# Patient Record
Sex: FEMALE | Race: WHITE | NOT HISPANIC OR LATINO | Employment: UNEMPLOYED | URBAN - METROPOLITAN AREA
[De-identification: names, ages, dates, MRNs, and addresses within clinical notes are randomized per-mention and may not be internally consistent; named-entity substitution may affect disease eponyms.]

---

## 2024-04-03 ENCOUNTER — OFFICE VISIT (OUTPATIENT)
Dept: PRIMARY CARE CLINIC | Facility: CLINIC | Age: 24
End: 2024-04-03
Payer: COMMERCIAL

## 2024-04-03 VITALS
BODY MASS INDEX: 20.98 KG/M2 | SYSTOLIC BLOOD PRESSURE: 128 MMHG | DIASTOLIC BLOOD PRESSURE: 84 MMHG | HEIGHT: 62 IN | TEMPERATURE: 98 F | RESPIRATION RATE: 16 BRPM | OXYGEN SATURATION: 100 % | HEART RATE: 92 BPM | WEIGHT: 114 LBS

## 2024-04-03 DIAGNOSIS — Z83.42 FAMILY HISTORY OF HIGH CHOLESTEROL: ICD-10-CM

## 2024-04-03 DIAGNOSIS — Z00.00 WELLNESS EXAMINATION: ICD-10-CM

## 2024-04-03 DIAGNOSIS — F41.1 GAD (GENERALIZED ANXIETY DISORDER): ICD-10-CM

## 2024-04-03 DIAGNOSIS — F33.42 RECURRENT MAJOR DEPRESSIVE DISORDER, IN FULL REMISSION: ICD-10-CM

## 2024-04-03 DIAGNOSIS — Z76.89 ENCOUNTER TO ESTABLISH CARE WITH NEW DOCTOR: Primary | ICD-10-CM

## 2024-04-03 DIAGNOSIS — E55.9 VITAMIN D DEFICIENCY: ICD-10-CM

## 2024-04-03 PROBLEM — K64.4 RESIDUAL HEMORRHOIDAL SKIN TAGS: Status: ACTIVE | Noted: 2024-04-03

## 2024-04-03 PROCEDURE — 3008F BODY MASS INDEX DOCD: CPT | Mod: CPTII,,, | Performed by: STUDENT IN AN ORGANIZED HEALTH CARE EDUCATION/TRAINING PROGRAM

## 2024-04-03 PROCEDURE — 3079F DIAST BP 80-89 MM HG: CPT | Mod: CPTII,,, | Performed by: STUDENT IN AN ORGANIZED HEALTH CARE EDUCATION/TRAINING PROGRAM

## 2024-04-03 PROCEDURE — 99385 PREV VISIT NEW AGE 18-39: CPT | Mod: ,,, | Performed by: STUDENT IN AN ORGANIZED HEALTH CARE EDUCATION/TRAINING PROGRAM

## 2024-04-03 PROCEDURE — 3074F SYST BP LT 130 MM HG: CPT | Mod: CPTII,,, | Performed by: STUDENT IN AN ORGANIZED HEALTH CARE EDUCATION/TRAINING PROGRAM

## 2024-04-03 PROCEDURE — 1160F RVW MEDS BY RX/DR IN RCRD: CPT | Mod: CPTII,,, | Performed by: STUDENT IN AN ORGANIZED HEALTH CARE EDUCATION/TRAINING PROGRAM

## 2024-04-03 PROCEDURE — 3044F HG A1C LEVEL LT 7.0%: CPT | Mod: CPTII,,, | Performed by: STUDENT IN AN ORGANIZED HEALTH CARE EDUCATION/TRAINING PROGRAM

## 2024-04-03 PROCEDURE — 1159F MED LIST DOCD IN RCRD: CPT | Mod: CPTII,,, | Performed by: STUDENT IN AN ORGANIZED HEALTH CARE EDUCATION/TRAINING PROGRAM

## 2024-04-03 RX ORDER — BUPROPION HYDROCHLORIDE 300 MG/1
TABLET ORAL
COMMUNITY
Start: 2022-01-02

## 2024-04-03 RX ORDER — GABAPENTIN 300 MG/1
CAPSULE ORAL
COMMUNITY
Start: 2022-01-02

## 2024-04-03 RX ORDER — HYDROXYZINE PAMOATE 25 MG/1
CAPSULE ORAL
COMMUNITY
Start: 2022-01-02

## 2024-04-03 RX ORDER — PROPRANOLOL HYDROCHLORIDE 10 MG/1
TABLET ORAL
COMMUNITY
Start: 2022-01-02

## 2024-04-03 NOTE — PROGRESS NOTES
ANNUAL WELLNESS NOTE    Chief Complaint:  Establish Care     HPI:  Vanna Martinez is a 23 y.o. female    Past Medical History:   Anxiety  Vitamin D Deficiency   Depression    Patient Care Team:  Itzel Carlos MD as PCP - General (Internal Medicine)    Presents today for wellness visit. Describes overall health as good. Diet is healthy. Exercises 5 or more times per week. Tobacco use: never. Alcohol use: rare (special occasion). Caffeine intake: minimal/rare. Eye exam: annually (wears contacts). Dental exam: twice annually.  Patient is doing well.  Here to establish care as well.  Patient will go and get her wellness labs done soon.    Review of Systems   Constitutional:  Negative for chills and fever.   Respiratory:  Negative for cough.    Cardiovascular:  Negative for leg swelling.   Gastrointestinal:  Negative for abdominal pain, diarrhea, nausea and vomiting.   Genitourinary:  Negative for dysuria and hematuria.   Psychiatric/Behavioral:  Negative for dysphoric mood. The patient is not nervous/anxious.      Physical Exam  Vitals and nursing note reviewed.   Constitutional:       General: She is not in acute distress.     Appearance: Normal appearance. She is not ill-appearing.   HENT:      Nose: No rhinorrhea.      Mouth/Throat:      Mouth: Mucous membranes are moist.   Eyes:      General: No scleral icterus.     Conjunctiva/sclera: Conjunctivae normal.   Cardiovascular:      Rate and Rhythm: Normal rate and regular rhythm.      Pulses: Normal pulses.      Heart sounds: Normal heart sounds. No murmur heard.  Pulmonary:      Effort: Pulmonary effort is normal. No respiratory distress.      Breath sounds: Normal breath sounds. No wheezing.   Abdominal:      Palpations: Abdomen is soft.      Tenderness: There is no abdominal tenderness.   Musculoskeletal:      Right lower leg: No edema.      Left lower leg: No edema.   Skin:     General: Skin is warm.      Coloration: Skin is not jaundiced.   Neurological:       Mental Status: She is alert. Mental status is at baseline.      Gait: Gait normal.   Psychiatric:         Mood and Affect: Mood normal.         Behavior: Behavior normal.       Assessment/Plan:  1. Encounter to establish care with new doctor  Comments:  Reviewed medical history, reconciled medications   Ordered wellness labs  Requested records from previous provider/specialists    2. Wellness examination  Assessment & Plan:  Health Maintenance:  Routine Health Maintenance   Exercise as tolerated, >30 minutes a day for 3-5 days a week.  Counseled patient on compliance with medications and healthy diet.     Age-Appropriate Screening  Vaccinations:    - Flu:  Postponed, patient's preference   - Pneumonia:  NA   - Shingles (>50):  NA   - Tdap:  Postponed, patient's preference   - COVID:  Postponed, patient's preference    Screening:   - Pap Smear (21-65): UTD, requested records   - Mammogram (40-50 discuss w/ pt, all >50):  NA   - Osteoporosis (all>65, sooner if risk factors):  NA   - Lung Ca. Screening (50-80 if >20 pack yrs current or quit <15 yrs):  NA   - Colon Ca. Screening (age >45):  NA   - Hep. C, HIV: Negative     Orders:  -     CBC Auto Differential; Future; Expected date: 04/03/2024  -     Comprehensive Metabolic Panel; Future; Expected date: 04/03/2024  -     TSH; Future; Expected date: 04/03/2024  -     Lipid Panel; Future; Expected date: 04/03/2024  -     Urinalysis; Future; Expected date: 04/03/2024  -     Hemoglobin A1C; Future; Expected date: 04/03/2024  -     Vitamin D; Future; Expected date: 04/03/2024    3. Family history of high cholesterol  -     Lipid Panel; Future; Expected date: 04/03/2024    4. GREER (generalized anxiety disorder)  Assessment & Plan:  Stable, improved   Continue Wellbutrin, Propranolol   Encouraged self coping mechanisms (deep breathing, exercise, yoga, meditation, etc)     5. Recurrent major depressive disorder, in full remission  Assessment & Plan:  See above for further  details   Well controlled   Continue Wellbutrin    6. Vitamin D deficiency  Assessment & Plan:  Stable, continue supplementation   Repeat Level at Wellness Visit   Encouraged moderate sun exposure, increase PO calcium intake  Orders:  -     Vitamin D; Future; Expected date: 04/03/2024    Follow up in about 1 year (around 4/3/2025) for Wellness.

## 2024-04-05 ENCOUNTER — LAB VISIT (OUTPATIENT)
Dept: LAB | Facility: HOSPITAL | Age: 24
End: 2024-04-05
Attending: STUDENT IN AN ORGANIZED HEALTH CARE EDUCATION/TRAINING PROGRAM
Payer: COMMERCIAL

## 2024-04-05 DIAGNOSIS — Z83.42 FAMILY HISTORY OF HIGH CHOLESTEROL: ICD-10-CM

## 2024-04-05 DIAGNOSIS — Z76.89 ENCOUNTER TO ESTABLISH CARE WITH NEW DOCTOR: ICD-10-CM

## 2024-04-05 DIAGNOSIS — E55.9 VITAMIN D DEFICIENCY: ICD-10-CM

## 2024-04-05 DIAGNOSIS — F41.1 GAD (GENERALIZED ANXIETY DISORDER): ICD-10-CM

## 2024-04-05 DIAGNOSIS — F33.42 RECURRENT MAJOR DEPRESSIVE DISORDER, IN FULL REMISSION: ICD-10-CM

## 2024-04-05 DIAGNOSIS — Z00.00 WELLNESS EXAMINATION: ICD-10-CM

## 2024-04-05 LAB
ALBUMIN SERPL-MCNC: 4 G/DL (ref 3.5–5)
ALBUMIN/GLOB SERPL: 1.4 RATIO (ref 1.1–2)
ALP SERPL-CCNC: 70 UNIT/L (ref 40–150)
ALT SERPL-CCNC: 14 UNIT/L (ref 0–55)
APPEARANCE UR: CLEAR
AST SERPL-CCNC: 17 UNIT/L (ref 5–34)
BACTERIA #/AREA URNS AUTO: ABNORMAL /HPF
BASOPHILS # BLD AUTO: 0.05 X10(3)/MCL
BASOPHILS NFR BLD AUTO: 0.7 %
BILIRUB SERPL-MCNC: 0.4 MG/DL
BILIRUB UR QL STRIP.AUTO: NEGATIVE
BUN SERPL-MCNC: 11.9 MG/DL (ref 7–18.7)
CALCIUM SERPL-MCNC: 9.4 MG/DL (ref 8.4–10.2)
CHLORIDE SERPL-SCNC: 105 MMOL/L (ref 98–107)
CHOLEST SERPL-MCNC: 169 MG/DL
CHOLEST/HDLC SERPL: 2 {RATIO} (ref 0–5)
CO2 SERPL-SCNC: 26 MMOL/L (ref 22–29)
COLOR UR AUTO: YELLOW
CREAT SERPL-MCNC: 0.81 MG/DL (ref 0.55–1.02)
DEPRECATED CALCIDIOL+CALCIFEROL SERPL-MC: 29.8 NG/ML (ref 30–80)
EOSINOPHIL # BLD AUTO: 0.1 X10(3)/MCL (ref 0–0.9)
EOSINOPHIL NFR BLD AUTO: 1.4 %
ERYTHROCYTE [DISTWIDTH] IN BLOOD BY AUTOMATED COUNT: 12.1 % (ref 11.5–17)
EST. AVERAGE GLUCOSE BLD GHB EST-MCNC: 99.7 MG/DL
GFR SERPLBLD CREATININE-BSD FMLA CKD-EPI: >60 MLS/MIN/1.73/M2
GLOBULIN SER-MCNC: 2.8 GM/DL (ref 2.4–3.5)
GLUCOSE SERPL-MCNC: 91 MG/DL (ref 74–100)
GLUCOSE UR QL STRIP.AUTO: NORMAL
HBA1C MFR BLD: 5.1 %
HCT VFR BLD AUTO: 40.1 % (ref 37–47)
HDLC SERPL-MCNC: 68 MG/DL (ref 35–60)
HGB BLD-MCNC: 13 G/DL (ref 12–16)
IMM GRANULOCYTES # BLD AUTO: 0.02 X10(3)/MCL (ref 0–0.04)
IMM GRANULOCYTES NFR BLD AUTO: 0.3 %
KETONES UR QL STRIP.AUTO: NEGATIVE
LDLC SERPL CALC-MCNC: 90 MG/DL (ref 50–140)
LEUKOCYTE ESTERASE UR QL STRIP.AUTO: 75
LYMPHOCYTES # BLD AUTO: 1.91 X10(3)/MCL (ref 0.6–4.6)
LYMPHOCYTES NFR BLD AUTO: 26 %
MCH RBC QN AUTO: 29.7 PG (ref 27–31)
MCHC RBC AUTO-ENTMCNC: 32.4 G/DL (ref 33–36)
MCV RBC AUTO: 91.6 FL (ref 80–94)
MONOCYTES # BLD AUTO: 0.61 X10(3)/MCL (ref 0.1–1.3)
MONOCYTES NFR BLD AUTO: 8.3 %
MUCOUS THREADS URNS QL MICRO: ABNORMAL /LPF
NEUTROPHILS # BLD AUTO: 4.67 X10(3)/MCL (ref 2.1–9.2)
NEUTROPHILS NFR BLD AUTO: 63.3 %
NITRITE UR QL STRIP.AUTO: NEGATIVE
NRBC BLD AUTO-RTO: 0 %
PH UR STRIP.AUTO: 6.5 [PH]
PLATELET # BLD AUTO: 258 X10(3)/MCL (ref 130–400)
PMV BLD AUTO: 9.9 FL (ref 7.4–10.4)
POTASSIUM SERPL-SCNC: 4.1 MMOL/L (ref 3.5–5.1)
PROT SERPL-MCNC: 6.8 GM/DL (ref 6.4–8.3)
PROT UR QL STRIP.AUTO: ABNORMAL
RBC # BLD AUTO: 4.38 X10(6)/MCL (ref 4.2–5.4)
RBC #/AREA URNS AUTO: ABNORMAL /HPF
RBC UR QL AUTO: NEGATIVE
SODIUM SERPL-SCNC: 137 MMOL/L (ref 136–145)
SP GR UR STRIP.AUTO: 1.03 (ref 1–1.03)
SQUAMOUS #/AREA URNS LPF: ABNORMAL /HPF
TRIGL SERPL-MCNC: 54 MG/DL (ref 37–140)
TSH SERPL-ACNC: 1.01 UIU/ML (ref 0.35–4.94)
UROBILINOGEN UR STRIP-ACNC: NORMAL
VLDLC SERPL CALC-MCNC: 11 MG/DL
WBC # SPEC AUTO: 7.36 X10(3)/MCL (ref 4.5–11.5)
WBC #/AREA URNS AUTO: ABNORMAL /HPF

## 2024-04-05 PROCEDURE — 80061 LIPID PANEL: CPT

## 2024-04-05 PROCEDURE — 80053 COMPREHEN METABOLIC PANEL: CPT

## 2024-04-05 PROCEDURE — 82306 VITAMIN D 25 HYDROXY: CPT

## 2024-04-05 PROCEDURE — 36415 COLL VENOUS BLD VENIPUNCTURE: CPT

## 2024-04-05 PROCEDURE — 83036 HEMOGLOBIN GLYCOSYLATED A1C: CPT

## 2024-04-05 PROCEDURE — 84443 ASSAY THYROID STIM HORMONE: CPT

## 2024-04-05 PROCEDURE — 85025 COMPLETE CBC W/AUTO DIFF WBC: CPT

## 2024-04-05 PROCEDURE — 81001 URINALYSIS AUTO W/SCOPE: CPT

## 2024-04-11 ENCOUNTER — PATIENT MESSAGE (OUTPATIENT)
Dept: PRIMARY CARE CLINIC | Facility: CLINIC | Age: 24
End: 2024-04-11
Payer: COMMERCIAL

## 2024-04-22 ENCOUNTER — PATIENT MESSAGE (OUTPATIENT)
Dept: PRIMARY CARE CLINIC | Facility: CLINIC | Age: 24
End: 2024-04-22
Payer: COMMERCIAL

## 2024-05-02 PROBLEM — E55.9 VITAMIN D DEFICIENCY: Chronic | Status: ACTIVE | Noted: 2024-04-03

## 2024-05-02 PROBLEM — F41.1 GAD (GENERALIZED ANXIETY DISORDER): Chronic | Status: ACTIVE | Noted: 2024-04-03

## 2024-05-02 NOTE — ASSESSMENT & PLAN NOTE
Stable, improved   Continue Wellbutrin, Propranolol   Encouraged self coping mechanisms (deep breathing, exercise, yoga, meditation, etc)

## 2024-05-02 NOTE — ASSESSMENT & PLAN NOTE
Health Maintenance:  Routine Health Maintenance   Exercise as tolerated, >30 minutes a day for 3-5 days a week.  Counseled patient on compliance with medications and healthy diet.     Age-Appropriate Screening  Vaccinations:    - Flu:  Postponed, patient's preference   - Pneumonia:  NA   - Shingles (>50):  NA   - Tdap:  Postponed, patient's preference   - COVID:  Postponed, patient's preference    Screening:   - Pap Smear (21-65): UTD, requested records   - Mammogram (40-50 discuss w/ pt, all >50):  NA   - Osteoporosis (all>65, sooner if risk factors):  NA   - Lung Ca. Screening (50-80 if >20 pack yrs current or quit <15 yrs):  NA   - Colon Ca. Screening (age >45):  NA   - Hep. C, HIV: Negative

## 2024-06-21 ENCOUNTER — LAB VISIT (OUTPATIENT)
Dept: LAB | Facility: HOSPITAL | Age: 24
End: 2024-06-21
Attending: STUDENT IN AN ORGANIZED HEALTH CARE EDUCATION/TRAINING PROGRAM
Payer: COMMERCIAL

## 2024-06-21 ENCOUNTER — OFFICE VISIT (OUTPATIENT)
Dept: PRIMARY CARE CLINIC | Facility: CLINIC | Age: 24
End: 2024-06-21
Payer: COMMERCIAL

## 2024-06-21 VITALS
TEMPERATURE: 98 F | BODY MASS INDEX: 20.98 KG/M2 | OXYGEN SATURATION: 98 % | RESPIRATION RATE: 16 BRPM | SYSTOLIC BLOOD PRESSURE: 111 MMHG | WEIGHT: 114 LBS | DIASTOLIC BLOOD PRESSURE: 71 MMHG | HEIGHT: 62 IN | HEART RATE: 86 BPM

## 2024-06-21 DIAGNOSIS — R59.0 AXILLARY LYMPHADENOPATHY: ICD-10-CM

## 2024-06-21 DIAGNOSIS — R59.0 AXILLARY LYMPHADENOPATHY: Primary | ICD-10-CM

## 2024-06-21 LAB
BASOPHILS # BLD AUTO: 0.04 X10(3)/MCL
BASOPHILS NFR BLD AUTO: 0.6 %
EOSINOPHIL # BLD AUTO: 0.09 X10(3)/MCL (ref 0–0.9)
EOSINOPHIL NFR BLD AUTO: 1.3 %
ERYTHROCYTE [DISTWIDTH] IN BLOOD BY AUTOMATED COUNT: 11.9 % (ref 11.5–17)
HCT VFR BLD AUTO: 43.4 % (ref 37–47)
HGB BLD-MCNC: 14.4 G/DL (ref 12–16)
IMM GRANULOCYTES # BLD AUTO: 0.01 X10(3)/MCL (ref 0–0.04)
IMM GRANULOCYTES NFR BLD AUTO: 0.1 %
LYMPHOCYTES # BLD AUTO: 1.65 X10(3)/MCL (ref 0.6–4.6)
LYMPHOCYTES NFR BLD AUTO: 23.7 %
MCH RBC QN AUTO: 30.3 PG (ref 27–31)
MCHC RBC AUTO-ENTMCNC: 33.2 G/DL (ref 33–36)
MCV RBC AUTO: 91.2 FL (ref 80–94)
MONOCYTES # BLD AUTO: 0.55 X10(3)/MCL (ref 0.1–1.3)
MONOCYTES NFR BLD AUTO: 7.9 %
NEUTROPHILS # BLD AUTO: 4.63 X10(3)/MCL (ref 2.1–9.2)
NEUTROPHILS NFR BLD AUTO: 66.4 %
NRBC BLD AUTO-RTO: 0 %
PLATELET # BLD AUTO: 283 X10(3)/MCL (ref 130–400)
PMV BLD AUTO: 9.8 FL (ref 7.4–10.4)
RBC # BLD AUTO: 4.76 X10(6)/MCL (ref 4.2–5.4)
WBC # BLD AUTO: 6.97 X10(3)/MCL (ref 4.5–11.5)

## 2024-06-21 PROCEDURE — 36415 COLL VENOUS BLD VENIPUNCTURE: CPT

## 2024-06-21 PROCEDURE — 85025 COMPLETE CBC W/AUTO DIFF WBC: CPT

## 2024-06-21 NOTE — PROGRESS NOTES
Subjective:       Patient ID: Vanna Martinez is a 23 y.o. female.    -------------------------------------    Depression      Chief Complaint: nodule armpit (Left arm pit-pain to touch)    Presents to the clinic for follow-up.  Endorsed tender swelling of her left armpit, appears to be a lymph node.  States that this has happened before during sickness.  But denies any recent illnesses.  Patient also notices a nodule in the back of her head as well.  It is not tender anymore.      Review of Systems   Constitutional:  Negative for chills and fever.   HENT:  Negative for nasal congestion, ear pain, sinus pressure/congestion and sore throat.    Respiratory:  Negative for cough and shortness of breath.    Cardiovascular:  Negative for chest pain.   Gastrointestinal:  Negative for abdominal pain, diarrhea, nausea and vomiting.   Genitourinary:  Negative for dysuria and hematuria.   Integumentary:  Negative for wound, mole/lesion and breast mass.   Hematological:  Positive for adenopathy.   Breast: Negative for mass        Objective:      Physical Exam  Vitals and nursing note reviewed.   Constitutional:       General: She is not in acute distress.     Appearance: Normal appearance. She is not ill-appearing.   HENT:      Head: Normocephalic.      Nose: Nose normal.      Mouth/Throat:      Mouth: Mucous membranes are moist.   Eyes:      General: No scleral icterus.     Conjunctiva/sclera: Conjunctivae normal.   Cardiovascular:      Rate and Rhythm: Normal rate and regular rhythm.   Pulmonary:      Effort: Pulmonary effort is normal. No respiratory distress.   Abdominal:      Palpations: Abdomen is soft.      Tenderness: There is no abdominal tenderness.   Lymphadenopathy:      Head:      Right side of head: No submandibular, tonsillar, preauricular or posterior auricular adenopathy.      Left side of head: No submandibular, tonsillar, preauricular or posterior auricular adenopathy.      Cervical: No cervical adenopathy.       Right cervical: No superficial or posterior cervical adenopathy.     Left cervical: No superficial or posterior cervical adenopathy.      Upper Body:      Right upper body: No axillary adenopathy.      Left upper body: Axillary adenopathy present.   Skin:     General: Skin is warm and dry.      Coloration: Skin is not jaundiced.   Neurological:      Mental Status: She is alert and oriented to person, place, and time. Mental status is at baseline.      Cranial Nerves: No cranial nerve deficit.      Gait: Gait normal.   Psychiatric:         Mood and Affect: Mood normal.         Behavior: Behavior normal.         Assessment & Plan:   1. Axillary lymphadenopathy  -     US Soft Tissue Axilla, Left; Future; Expected date: 06/21/2024  -     CBC Auto Differential; Future; Expected date: 06/21/2024      Follow up if symptoms worsen or fail to improve. In addition to their scheduled follow up, the patient has also been instructed to follow up on as needed basis.

## 2024-07-08 PROBLEM — Z00.00 WELLNESS EXAMINATION: Chronic | Status: RESOLVED | Noted: 2024-04-03 | Resolved: 2024-07-08

## 2024-07-16 ENCOUNTER — HOSPITAL ENCOUNTER (OUTPATIENT)
Dept: RADIOLOGY | Facility: HOSPITAL | Age: 24
Discharge: HOME | End: 2024-07-16
Attending: STUDENT IN AN ORGANIZED HEALTH CARE EDUCATION/TRAINING PROGRAM
Payer: COMMERCIAL

## 2024-07-16 DIAGNOSIS — R59.0 LYMPHADENOPATHY, AXILLARY: ICD-10-CM

## 2024-07-16 PROCEDURE — 76882 US LMTD JT/FCL EVL NVASC XTR: CPT | Mod: TC,LT

## 2024-07-16 PROCEDURE — 76882 US LMTD JT/FCL EVL NVASC XTR: CPT | Mod: 26,LT,, | Performed by: RADIOLOGY

## 2024-07-23 ENCOUNTER — TELEPHONE (OUTPATIENT)
Dept: PRIMARY CARE CLINIC | Facility: CLINIC | Age: 24
End: 2024-07-23
Payer: COMMERCIAL

## 2024-07-23 NOTE — TELEPHONE ENCOUNTER
----- Message from Karey Darling sent at 7/23/2024  1:44 PM CDT -----  Regarding: returning call  .Who Called: Vanna Martinez    Patient is returning phone call    Who Left Message for Patient:  Does the patient know what this is regarding?:      Preferred Method of Contact: Phone Call  Patient's Preferred Phone Number on File: [phone]   Best Call Back Number, if different:  Additional Information: pt returning call ABL no answer

## 2025-02-06 DIAGNOSIS — F33.42 RECURRENT MAJOR DEPRESSIVE DISORDER, IN FULL REMISSION: ICD-10-CM

## 2025-02-06 DIAGNOSIS — F41.1 GAD (GENERALIZED ANXIETY DISORDER): Primary | ICD-10-CM

## 2025-02-12 ENCOUNTER — OFFICE VISIT (OUTPATIENT)
Dept: PRIMARY CARE CLINIC | Facility: CLINIC | Age: 25
End: 2025-02-12
Payer: COMMERCIAL

## 2025-02-12 DIAGNOSIS — L72.3 INFLAMED SEBACEOUS CYST: Primary | ICD-10-CM

## 2025-02-12 PROCEDURE — 99213 OFFICE O/P EST LOW 20 MIN: CPT | Mod: ,,, | Performed by: STUDENT IN AN ORGANIZED HEALTH CARE EDUCATION/TRAINING PROGRAM

## 2025-02-12 PROCEDURE — 1159F MED LIST DOCD IN RCRD: CPT | Mod: CPTII,,, | Performed by: STUDENT IN AN ORGANIZED HEALTH CARE EDUCATION/TRAINING PROGRAM

## 2025-02-12 PROCEDURE — 1160F RVW MEDS BY RX/DR IN RCRD: CPT | Mod: CPTII,,, | Performed by: STUDENT IN AN ORGANIZED HEALTH CARE EDUCATION/TRAINING PROGRAM

## 2025-02-12 RX ORDER — MUPIROCIN 20 MG/G
OINTMENT TOPICAL 2 TIMES DAILY
Qty: 22 G | Refills: 2 | Status: SHIPPED | OUTPATIENT
Start: 2025-02-12

## 2025-02-12 NOTE — PROGRESS NOTES
Subjective:       Patient ID: Vanna Martinez is a 24 y.o. female.    -------------------------------------    Depression      Chief Complaint: nodule on shoulder blade    History of Present Illness    CHIEF COMPLAINT:  Patient presents today with a lump on left shoulder blade    HISTORY OF PRESENT ILLNESS:  She reports a lump on the left shoulder blade present for a few months without associated trauma. This is her first experience with this type of lump. She experiences discomfort when lying on her back and denies any discharge from the lump. She experienced mild fever last week but denies current fever and chills.    PAST MEDICAL HISTORY:  History of eczema and melanocytic nevi previously managed by AARON Recinos.        Review of Systems   Constitutional:  Negative for chills and fever.   Respiratory:  Negative for cough.    Cardiovascular:  Negative for chest pain.   Gastrointestinal:  Negative for abdominal pain, nausea and vomiting.   Genitourinary:  Negative for dysuria and hematuria.   Integumentary:         Nodule left shoulder blade         Objective:      Physical Exam  Vitals and nursing note reviewed.   Constitutional:       General: She is not in acute distress.     Appearance: Normal appearance. She is not ill-appearing.   HENT:      Head: Normocephalic.      Nose: Nose normal. No rhinorrhea.      Mouth/Throat:      Mouth: Mucous membranes are moist.   Eyes:      General: No scleral icterus.     Conjunctiva/sclera: Conjunctivae normal.   Cardiovascular:      Rate and Rhythm: Normal rate and regular rhythm.   Pulmonary:      Effort: Pulmonary effort is normal. No respiratory distress.   Musculoskeletal:         General: No deformity.   Skin:     General: Skin is warm and dry.      Coloration: Skin is not jaundiced.      Comments: Inflamed sebaceous cyst on left shoulder blade, expressed manually thick white consistency, mild purulence at the end with serosanguinous fluid.   Neurological:      Mental  Status: She is alert and oriented to person, place, and time. Mental status is at baseline.      Gait: Gait normal.   Psychiatric:         Mood and Affect: Mood normal.         Behavior: Behavior normal.         Assessment & Plan:   Assessment & Plan    L72.3 Inflamed Sebaceous cyst    IMPRESSION:  Diagnosed sebaceous cyst on left shoulder blade  Cyst not infected but enlarged & inflamed, causing discomfort  Performed manual expression of cyst contents for temporary relief  Mild purulence noted at end of expression, indicating potential early infection    SEBACEOUS CYST:  - Provided immediate relief by draining the cyst.  - Educated the patient about the nature of sebaceous cysts, including potential for refilling, irritation, and recurrence.  - Prescribed Bactroban ointment to be applied to the affected area 2 times daily for 5 days.  - Discussed the possibility of complete excision by a dermatologist if the cyst continues to be problematic.      Follow up if symptoms worsen or fail to improve. In addition to their scheduled follow up, the patient has also been instructed to follow up on as needed basis.    This note was generated with the assistance of ambient listening technology. Verbal consent was obtained by the patient and accompanying visitor(s) for the recording of patient appointment to facilitate this note. I attest to having reviewed and edited the generated note for accuracy, though some syntax or spelling errors may persist. Please contact the author of this note for any clarification.

## 2025-03-19 ENCOUNTER — OFFICE VISIT (OUTPATIENT)
Dept: BEHAVIORAL HEALTH | Facility: CLINIC | Age: 25
End: 2025-03-19
Payer: COMMERCIAL

## 2025-03-19 VITALS — HEIGHT: 62 IN | WEIGHT: 116 LBS | BODY MASS INDEX: 21.35 KG/M2

## 2025-03-19 DIAGNOSIS — F41.1 GAD (GENERALIZED ANXIETY DISORDER): ICD-10-CM

## 2025-03-19 DIAGNOSIS — F33.42 RECURRENT MAJOR DEPRESSIVE DISORDER, IN FULL REMISSION: ICD-10-CM

## 2025-03-19 PROCEDURE — 99213 OFFICE O/P EST LOW 20 MIN: CPT | Mod: PBBFAC,PN | Performed by: NURSE PRACTITIONER

## 2025-03-19 PROCEDURE — 1160F RVW MEDS BY RX/DR IN RCRD: CPT | Mod: CPTII,,, | Performed by: NURSE PRACTITIONER

## 2025-03-19 PROCEDURE — 1159F MED LIST DOCD IN RCRD: CPT | Mod: CPTII,,, | Performed by: NURSE PRACTITIONER

## 2025-03-19 PROCEDURE — 99215 OFFICE O/P EST HI 40 MIN: CPT | Mod: S$PBB,,, | Performed by: NURSE PRACTITIONER

## 2025-03-19 PROCEDURE — 3008F BODY MASS INDEX DOCD: CPT | Mod: CPTII,,, | Performed by: NURSE PRACTITIONER

## 2025-03-19 RX ORDER — ALPRAZOLAM 0.25 MG/1
0.25 TABLET ORAL 2 TIMES DAILY PRN
Qty: 60 TABLET | Refills: 1 | Status: SHIPPED | OUTPATIENT
Start: 2025-03-19

## 2025-03-19 RX ORDER — FLUOXETINE HYDROCHLORIDE 20 MG/1
20 CAPSULE ORAL DAILY
Qty: 30 CAPSULE | Refills: 3 | Status: SHIPPED | OUTPATIENT
Start: 2025-03-19

## 2025-03-19 NOTE — PATIENT INSTRUCTIONS
Plan:  1. Start Prozac 20mg a day  2. Start Xanax 0.25mg BID as needed for panic  3. FU in 3 months virtual

## 2025-03-19 NOTE — PROGRESS NOTES
"Initial Interview  03/19/2025  HPI: Vanna Martinez is a 24 y.o. female here today for a psychiatric evaluation referred by PCP to the [facility] for evaluation and medication management of anxiety.    Chief Complaint: "Bad anxiety, negative thoughts, physical symptoms like blushing/rushing and crying spells"  Onset: chronic  Precipitating factors: difficulty at work  Persistent symptoms: depression, irritability, worry/anxiety, disorganization      Patient states that she has generalized anxiety; that there is not one specific thing that is causing her anxiety.   States that her anxiety started when she was in HS 2017. She got professional help (a therapist and a psychiatrist for meds) when she was in college (2020) and it was helpful. She was started on Gabapentin and Wellbutrin and she stayed on the meds for a few years. She got off of the meds about 6 months ago because she felt that the meds were not working. She was beginning to have physical sympomts of anxiety.   She does not feel that she is having panic attacks but she continues to report symptoms that verge on the edge of panic. She states that she has a lot of anticipatory anxiety. She states that, for example, while traveling to a bird event, she will start to cry out of fear or anxiety that she will not do well.   Her anxiety is worsening.   Currently, she is prescribed Vistaril but she does not take it because it makes her drowsy.   She has never had Xanax or Ativan.   Will start Prozac 20mg a day.   Will also start Xanax 0.25mg PRN severe anxiety/panic.  Also strongly encouraged 1:1 therapy.      Past Medical History:     -------------------------------------    Depression        Medications:   Current Outpatient Medications   Medication Instructions    ALPRAZolam (XANAX) 0.25 mg, Oral, 2 times daily PRN    FLUoxetine 20 mg, Oral, Daily    hydrOXYzine pamoate (VISTARIL) 25 MG Cap     mupirocin (BACTROBAN) 2 % ointment Topical (Top), 2 times daily "    propranoloL (INDERAL) 10 MG tablet         Psychiatric History:   Reports a prior psychiatric history: anxiety  History of mental health out-patient treatment: therapist and psychiatrist  Has been to 1:1 therapy in the past and is willing to go back to 1:1 therapy but not to group therapy  History of in-patient psychiatric hospitalization: denies  History of suicidal ideations: denies  History of suicidal threats: denies  History of suicide attempts: denies  History of self mutilation: denies    History of psychotropic medications:   Wellbutrin  Neurontin  Vistaril  Propranolol    Family Psychiatric History:  Mental Illness: mother with anxiety  Alcohol abuse/addiction:   Drug addiction:     Substance Use History:  Denies any history of drug or alcohol abuse or addiction  Alcohol: denies  Marijuana: smokes about once a week  Benzodiazepines: denies  Opiates: denies  Stimulants: denies  Cocaine: denies  Methamphetamine: denies  Nicotine:  Caffeine:    Social History:   [place] up in[place]  Raised by: parents  Number of siblings: one  Education: college Environmental Science   Employment: employed - she will start [employer] on March 31  Marital Status: single  Children: none  Living situation: in a house with boyfriend  Worship affiliation: none    Trauma History:  History of Emotional/Mental abuse: denies  History of Physical abuse: denies  History of Sexual abuse: denies  History of other trauma: denies    Legal History:  Legal history: denies  Denies being on probation or parole  Denies any upcoming court dates  Denies any pending charges.    PHQ Score:   03/19/2025: 6 mild    GREER-7 Score:   03/19/2025: 14 moderate    Mental Status Evaluation:  Appearance:  unremarkable, age appropriate   Behavior:  normal, cooperative   Speech:  no latency; no press   Mood:  euthymic   Affect:  congruent and appropriate   Thought Process:  normal and logical   Thought Content:  normal, no suicidality, no homicidality,  delusions, or paranoia   Sensorium:  grossly intact   Cognition:  grossly intact   Insight:  intact   Judgment:  behavior is adequate to circumstances     Impression:      ICD-10-CM ICD-9-CM   1. GREER (generalized anxiety disorder)  F41.1 300.02   2. Recurrent major depressive disorder, in full remission  F33.42 296.36      Plan:  1. Start Prozac 20mg a day  2. Start Xanax 0.25mg BID as needed for panic  3. FU in 3 months virtual

## 2025-04-02 DIAGNOSIS — E55.9 VITAMIN D DEFICIENCY: ICD-10-CM

## 2025-04-02 DIAGNOSIS — Z00.00 WELLNESS EXAMINATION: Primary | ICD-10-CM

## 2025-04-03 ENCOUNTER — LAB VISIT (OUTPATIENT)
Dept: LAB | Facility: HOSPITAL | Age: 25
End: 2025-04-03
Attending: STUDENT IN AN ORGANIZED HEALTH CARE EDUCATION/TRAINING PROGRAM
Payer: COMMERCIAL

## 2025-04-03 ENCOUNTER — RESULTS FOLLOW-UP (OUTPATIENT)
Dept: PRIMARY CARE CLINIC | Facility: CLINIC | Age: 25
End: 2025-04-03

## 2025-04-03 DIAGNOSIS — E55.9 VITAMIN D DEFICIENCY: ICD-10-CM

## 2025-04-03 DIAGNOSIS — Z00.00 WELLNESS EXAMINATION: ICD-10-CM

## 2025-04-03 LAB
25(OH)D3+25(OH)D2 SERPL-MCNC: 41 NG/ML (ref 30–80)
ALBUMIN SERPL-MCNC: 4.3 G/DL (ref 3.5–5)
ALBUMIN/GLOB SERPL: 1.4 RATIO (ref 1.1–2)
ALP SERPL-CCNC: 78 UNIT/L (ref 40–150)
ALT SERPL-CCNC: 11 UNIT/L (ref 0–55)
ANION GAP SERPL CALC-SCNC: 7 MEQ/L
AST SERPL-CCNC: 17 UNIT/L (ref 11–45)
BACTERIA #/AREA URNS AUTO: ABNORMAL /HPF
BASOPHILS # BLD AUTO: 0.04 X10(3)/MCL
BASOPHILS NFR BLD AUTO: 0.6 %
BILIRUB SERPL-MCNC: 0.5 MG/DL
BILIRUB UR QL STRIP.AUTO: NEGATIVE
BUN SERPL-MCNC: 12.2 MG/DL (ref 7–18.7)
CALCIUM SERPL-MCNC: 9.5 MG/DL (ref 8.4–10.2)
CHLORIDE SERPL-SCNC: 106 MMOL/L (ref 98–107)
CHOLEST SERPL-MCNC: 156 MG/DL
CHOLEST/HDLC SERPL: 2 {RATIO} (ref 0–5)
CLARITY UR: CLEAR
CO2 SERPL-SCNC: 26 MMOL/L (ref 22–29)
COLOR UR AUTO: YELLOW
CREAT SERPL-MCNC: 0.8 MG/DL (ref 0.55–1.02)
CREAT/UREA NIT SERPL: 15
EOSINOPHIL # BLD AUTO: 0.04 X10(3)/MCL (ref 0–0.9)
EOSINOPHIL NFR BLD AUTO: 0.6 %
ERYTHROCYTE [DISTWIDTH] IN BLOOD BY AUTOMATED COUNT: 12.3 % (ref 11.5–17)
EST. AVERAGE GLUCOSE BLD GHB EST-MCNC: 105.4 MG/DL
GFR SERPLBLD CREATININE-BSD FMLA CKD-EPI: >60 ML/MIN/1.73/M2
GLOBULIN SER-MCNC: 3 GM/DL (ref 2.4–3.5)
GLUCOSE SERPL-MCNC: 84 MG/DL (ref 74–100)
GLUCOSE UR QL STRIP: NORMAL
HBA1C MFR BLD: 5.3 %
HCT VFR BLD AUTO: 41 % (ref 37–47)
HDLC SERPL-MCNC: 71 MG/DL (ref 35–60)
HGB BLD-MCNC: 13.5 G/DL (ref 12–16)
HGB UR QL STRIP: NEGATIVE
IMM GRANULOCYTES # BLD AUTO: 0.02 X10(3)/MCL (ref 0–0.04)
IMM GRANULOCYTES NFR BLD AUTO: 0.3 %
KETONES UR QL STRIP: ABNORMAL
LDLC SERPL CALC-MCNC: 77 MG/DL (ref 50–140)
LEUKOCYTE ESTERASE UR QL STRIP: NEGATIVE
LYMPHOCYTES # BLD AUTO: 1.18 X10(3)/MCL (ref 0.6–4.6)
LYMPHOCYTES NFR BLD AUTO: 18.9 %
MCH RBC QN AUTO: 30.1 PG (ref 27–31)
MCHC RBC AUTO-ENTMCNC: 32.9 G/DL (ref 33–36)
MCV RBC AUTO: 91.5 FL (ref 80–94)
MONOCYTES # BLD AUTO: 0.51 X10(3)/MCL (ref 0.1–1.3)
MONOCYTES NFR BLD AUTO: 8.2 %
MUCOUS THREADS URNS QL MICRO: ABNORMAL /LPF
NEUTROPHILS # BLD AUTO: 4.45 X10(3)/MCL (ref 2.1–9.2)
NEUTROPHILS NFR BLD AUTO: 71.4 %
NITRITE UR QL STRIP: NEGATIVE
NRBC BLD AUTO-RTO: 0 %
PH UR STRIP: 6 [PH]
PLATELET # BLD AUTO: 281 X10(3)/MCL (ref 130–400)
PMV BLD AUTO: 9.8 FL (ref 7.4–10.4)
POTASSIUM SERPL-SCNC: 4.7 MMOL/L (ref 3.5–5.1)
PROT SERPL-MCNC: 7.3 GM/DL (ref 6.4–8.3)
PROT UR QL STRIP: NEGATIVE
RBC # BLD AUTO: 4.48 X10(6)/MCL (ref 4.2–5.4)
RBC #/AREA URNS AUTO: ABNORMAL /HPF
SODIUM SERPL-SCNC: 139 MMOL/L (ref 136–145)
SP GR UR STRIP.AUTO: 1.03 (ref 1–1.03)
SQUAMOUS #/AREA URNS LPF: ABNORMAL /HPF
TRIGL SERPL-MCNC: 39 MG/DL (ref 37–140)
TSH SERPL-ACNC: 0.91 UIU/ML (ref 0.35–4.94)
UROBILINOGEN UR STRIP-ACNC: NORMAL
VLDLC SERPL CALC-MCNC: 8 MG/DL
WBC # BLD AUTO: 6.24 X10(3)/MCL (ref 4.5–11.5)
WBC #/AREA URNS AUTO: ABNORMAL /HPF

## 2025-04-03 PROCEDURE — 36415 COLL VENOUS BLD VENIPUNCTURE: CPT

## 2025-04-03 PROCEDURE — 85025 COMPLETE CBC W/AUTO DIFF WBC: CPT

## 2025-04-03 PROCEDURE — 83036 HEMOGLOBIN GLYCOSYLATED A1C: CPT

## 2025-04-03 PROCEDURE — 82306 VITAMIN D 25 HYDROXY: CPT

## 2025-04-03 PROCEDURE — 81001 URINALYSIS AUTO W/SCOPE: CPT

## 2025-04-03 PROCEDURE — 84443 ASSAY THYROID STIM HORMONE: CPT

## 2025-04-03 PROCEDURE — 80061 LIPID PANEL: CPT

## 2025-04-03 PROCEDURE — 80053 COMPREHEN METABOLIC PANEL: CPT

## 2025-04-07 ENCOUNTER — OFFICE VISIT (OUTPATIENT)
Dept: PRIMARY CARE CLINIC | Facility: CLINIC | Age: 25
End: 2025-04-07
Payer: COMMERCIAL

## 2025-04-07 VITALS
DIASTOLIC BLOOD PRESSURE: 64 MMHG | OXYGEN SATURATION: 98 % | SYSTOLIC BLOOD PRESSURE: 113 MMHG | RESPIRATION RATE: 18 BRPM | HEART RATE: 85 BPM | BODY MASS INDEX: 21.2 KG/M2 | WEIGHT: 115.19 LBS | TEMPERATURE: 98 F | HEIGHT: 62 IN

## 2025-04-07 DIAGNOSIS — E55.9 VITAMIN D DEFICIENCY: Chronic | ICD-10-CM

## 2025-04-07 DIAGNOSIS — Z83.42 FAMILY HISTORY OF HIGH CHOLESTEROL: Chronic | ICD-10-CM

## 2025-04-07 DIAGNOSIS — Z23 NEED FOR DIPHTHERIA-TETANUS-PERTUSSIS (TDAP) VACCINE: ICD-10-CM

## 2025-04-07 DIAGNOSIS — Z00.00 WELLNESS EXAMINATION: Primary | ICD-10-CM

## 2025-04-07 PROCEDURE — 99395 PREV VISIT EST AGE 18-39: CPT | Mod: 25,,, | Performed by: STUDENT IN AN ORGANIZED HEALTH CARE EDUCATION/TRAINING PROGRAM

## 2025-04-07 PROCEDURE — 3078F DIAST BP <80 MM HG: CPT | Mod: CPTII,,, | Performed by: STUDENT IN AN ORGANIZED HEALTH CARE EDUCATION/TRAINING PROGRAM

## 2025-04-07 PROCEDURE — 1159F MED LIST DOCD IN RCRD: CPT | Mod: CPTII,,, | Performed by: STUDENT IN AN ORGANIZED HEALTH CARE EDUCATION/TRAINING PROGRAM

## 2025-04-07 PROCEDURE — 3074F SYST BP LT 130 MM HG: CPT | Mod: CPTII,,, | Performed by: STUDENT IN AN ORGANIZED HEALTH CARE EDUCATION/TRAINING PROGRAM

## 2025-04-07 PROCEDURE — 1160F RVW MEDS BY RX/DR IN RCRD: CPT | Mod: CPTII,,, | Performed by: STUDENT IN AN ORGANIZED HEALTH CARE EDUCATION/TRAINING PROGRAM

## 2025-04-07 PROCEDURE — 3008F BODY MASS INDEX DOCD: CPT | Mod: CPTII,,, | Performed by: STUDENT IN AN ORGANIZED HEALTH CARE EDUCATION/TRAINING PROGRAM

## 2025-04-07 PROCEDURE — 90715 TDAP VACCINE 7 YRS/> IM: CPT | Mod: ,,, | Performed by: STUDENT IN AN ORGANIZED HEALTH CARE EDUCATION/TRAINING PROGRAM

## 2025-04-07 PROCEDURE — 90471 IMMUNIZATION ADMIN: CPT | Mod: ,,, | Performed by: STUDENT IN AN ORGANIZED HEALTH CARE EDUCATION/TRAINING PROGRAM

## 2025-04-07 PROCEDURE — 3044F HG A1C LEVEL LT 7.0%: CPT | Mod: CPTII,,, | Performed by: STUDENT IN AN ORGANIZED HEALTH CARE EDUCATION/TRAINING PROGRAM

## 2025-04-07 NOTE — ASSESSMENT & PLAN NOTE
Father has familial hypercholesterolemia  Last Lipid panel was WNL  Encouraged dietary/lifestyle modifications

## 2025-04-07 NOTE — PROGRESS NOTES
ANNUAL WELLNESS NOTE    Chief Complaint:  Annual Exam     HPI:  Vanna Martinez is a 24 y.o. female    -------------------------------------    Depression     Patient Care Team:  Itzel Carlos MD as PCP - General (Internal Medicine)  Minesh Gonzales MD as Consulting Physician (Obstetrics and Gynecology)  DAVY Melton MD as Consulting Physician (Gastroenterology)  Greg Recinos PA as Physician Assistant (Dermatology)    Presents today for wellness visit. Describes overall health as good. Diet is balanced. Exercises 1-2 times per week. Tobacco use: never. Alcohol use: rare (special occasion). Caffeine intake: 1 caffeinated drink daily. Eye exam: annually (wears contacts). Dental exam: twice annually.  Reviewed labs, answered all questions concerns at this time.  Started on Prozac recently, having some mild GI side effects.  But it has only been 2 weeks, told that it should improve.  Endorsed anxiety but waiting to see if the Prozac helps with that once it takes full effect    Review of Systems   Constitutional:  Negative for chills and fever.   Respiratory:  Negative for cough and shortness of breath.    Cardiovascular:  Negative for chest pain and leg swelling.   Gastrointestinal:  Negative for abdominal pain, diarrhea, nausea and vomiting.   Genitourinary:  Negative for dysuria and hematuria.   Psychiatric/Behavioral:  The patient is nervous/anxious.      Physical Exam  Vitals and nursing note reviewed.   Constitutional:       General: She is not in acute distress.     Appearance: Normal appearance. She is not ill-appearing.   HENT:      Nose: No rhinorrhea.      Mouth/Throat:      Mouth: Mucous membranes are moist.   Eyes:      General: No scleral icterus.     Conjunctiva/sclera: Conjunctivae normal.   Cardiovascular:      Rate and Rhythm: Normal rate and regular rhythm.      Pulses: Normal pulses.      Heart sounds: Normal heart sounds. No murmur heard.  Pulmonary:      Effort: Pulmonary  effort is normal. No respiratory distress.      Breath sounds: Normal breath sounds. No wheezing.   Abdominal:      Palpations: Abdomen is soft.      Tenderness: There is no abdominal tenderness.   Musculoskeletal:         General: No deformity.      Right lower leg: No edema.      Left lower leg: No edema.   Skin:     General: Skin is warm.      Coloration: Skin is not jaundiced.   Neurological:      Mental Status: She is alert. Mental status is at baseline.      Gait: Gait normal.   Psychiatric:         Mood and Affect: Mood normal.         Behavior: Behavior normal.       Assessment/Plan:  1. Wellness examination  Assessment & Plan:  Health Maintenance:  Routine Health Maintenance   Exercise as tolerated, >30 minutes a day for 3-5 days a week.  Counseled patient on compliance with medications and healthy diet.     Age-Appropriate Screening  Vaccinations:    - Flu:  UTD, completed for the season   - Pneumonia:  NA   - Shingles (>50):  NA   - Tdap:  Given today   - COVID:  UTD, completed    Screening:   - Pap Smear (21-65): UTD   - Mammogram (40-50 discuss w/ pt, all >50):  NA   - Osteoporosis (all>65, sooner if risk factors):  NA   - Lung Ca. Screening (50-80 if >20 pack yrs current or quit <15 yrs):  NA   - Colon Ca. Screening (age >45):  NA   - Hep. C, HIV: Negative     Orders:  -     CBC Auto Differential; Future; Expected date: 04/07/2026  -     Comprehensive Metabolic Panel; Future; Expected date: 04/07/2026  -     Lipid Panel; Future; Expected date: 04/07/2026  -     TSH; Future; Expected date: 04/07/2026  -     Hemoglobin A1C; Future; Expected date: 04/07/2026  -     Urinalysis; Future; Expected date: 04/07/2026  -     Vitamin D; Future; Expected date: 04/07/2026    2. Need for diphtheria-tetanus-pertussis (Tdap) vaccine  -     Tdap (BOOSTRIX) vaccine injection 0.5 mL    3. Vitamin D deficiency  Assessment & Plan:  Stable, continue supplementation   Repeat Level at Wellness Visit   Encouraged moderate sun  exposure, increase PO calcium intake      Orders:  -     Vitamin D; Future; Expected date: 04/07/2026    4. Family history of high cholesterol  Assessment & Plan:  Father has familial hypercholesterolemia  Last Lipid panel was WNL  Encouraged dietary/lifestyle modifications     Orders:  -     Comprehensive Metabolic Panel; Future; Expected date: 04/07/2026  -     Lipid Panel; Future; Expected date: 04/07/2026    Follow up in about 1 year (around 4/7/2026) for Wellness, Lab Results.

## 2025-04-07 NOTE — ASSESSMENT & PLAN NOTE
Health Maintenance:  Routine Health Maintenance   Exercise as tolerated, >30 minutes a day for 3-5 days a week.  Counseled patient on compliance with medications and healthy diet.     Age-Appropriate Screening  Vaccinations:    - Flu:  UTD, completed for the season   - Pneumonia:  NA   - Shingles (>50):  NA   - Tdap:  Given today   - COVID:  UTD, completed    Screening:   - Pap Smear (21-65): UTD   - Mammogram (40-50 discuss w/ pt, all >50):  NA   - Osteoporosis (all>65, sooner if risk factors):  NA   - Lung Ca. Screening (50-80 if >20 pack yrs current or quit <15 yrs):  NA   - Colon Ca. Screening (age >45):  NA   - Hep. C, HIV: Negative

## 2025-06-20 ENCOUNTER — OFFICE VISIT (OUTPATIENT)
Dept: BEHAVIORAL HEALTH | Facility: CLINIC | Age: 25
End: 2025-06-20
Payer: COMMERCIAL

## 2025-06-20 DIAGNOSIS — F33.42 RECURRENT MAJOR DEPRESSIVE DISORDER, IN FULL REMISSION: Primary | Chronic | ICD-10-CM

## 2025-06-20 DIAGNOSIS — F41.1 GAD (GENERALIZED ANXIETY DISORDER): Chronic | ICD-10-CM

## 2025-06-20 RX ORDER — FLUOXETINE 20 MG/1
20 CAPSULE ORAL DAILY
Qty: 90 CAPSULE | Refills: 1 | Status: SHIPPED | OUTPATIENT
Start: 2025-06-20

## 2025-06-20 NOTE — PROGRESS NOTES
The patient location is: [place]  The chief complaint leading to consultation is: medication management of MDD, GREER, and panic    Visit type: audiovisual    Face to Face time with patient: 10minutes  15 minutes of total time spent on the encounter, which includes face to face time and non-face to face time preparing to see the patient (eg, review of tests), Obtaining and/or reviewing separately obtained history, Documenting clinical information in the electronic or other health record, Independently interpreting results (not separately reported) and communicating results to the patient/family/caregiver, or Care coordination (not separately reported).     Each patient to whom he or she provides medical services by telemedicine is:  (1) informed of the relationship between the physician and patient and the respective role of any other health care provider with respect to management of the patient; and (2) notified that he or she may decline to receive medical services by telemedicine and may withdraw from such care at any time.    NOTES    Follow-up #1  06/20/2025  HPI: Vanna Martinez is a 24 y.o. female here today for medication management of MDD and GREER  Past Medical History:     -------------------------------------    Depression        Last visit: Patient states that she has generalized anxiety; that there is not one specific thing that is causing her anxiety.   States that her anxiety started when she was in HS 2017. She got professional help (a therapist and a psychiatrist for meds) when she was in college (2020) and it was helpful. She was started on Gabapentin and Wellbutrin and she stayed on the meds for a few years. She got off of the meds about 6 months ago because she felt that the meds were not working. She was beginning to have physical sympomts of anxiety.   She does not feel that she is having panic attacks but she continues to report symptoms that verge on the edge of panic. She states that she has a  lot of anticipatory anxiety. She states that, for example, while traveling to a bird event, she will start to cry out of fear or anxiety that she will not do well.   Her anxiety is worsening.   Currently, she is prescribed Vistaril but she does not take it because it makes her drowsy.   She has never had Xanax or Ativan.   Will start Prozac 20mg a day.   Will also start Xanax 0.25mg PRN severe anxiety/panic.  Also strongly encouraged 1:1 therapy.    This visit: Today, patient states that she is doing well on the Prozac and the Xanax. She states that she has only had to use the Xanax twice.   She has started a new job which has been helpful to reduce her anxiety.     PHQ Score:   06/20/2025: virtual  03/19/2025: 6 mild    GREER-7 Score:   06/20/2025: virtual  03/19/2025: 14 moderate      Mental Status Evaluation:  Appearance:  unremarkable, age appropriate   Behavior:  normal, cooperative   Speech:  no latency; no press   Mood:  euthymic   Affect:  congruent and appropriate   Thought Process:  normal and logical   Thought Content:  normal, no suicidality, no homicidality, delusions, or paranoia   Sensorium:  grossly intact   Cognition:  grossly intact   Insight:  intact   Judgment:  behavior is adequate to circumstances     Impression:      ICD-10-CM ICD-9-CM   1. Recurrent major depressive disorder, in full remission  F33.42 296.36   2. GREER (generalized anxiety disorder)  F41.1 300.02        Plan:  1. Start Prozac 20mg a day  2. Start Xanax 0.25mg BID as needed for panic    No need for PEC as pt is not an imminent danger to self or others or gravely disabled due to acute psychiatric illness     Discussed that pt should either call clinic for psychiatric crisis symptoms or present to nearest emergency room     Discussed with patient informed consent including diagnosis, risks and benefits of proposed treatment above vs. alternative treatments vs. no treatment, as well as serious and common side effects of these  "treatments, and the inherent unpredictability of individual responses to these treatments. The patient expresses understanding of the above and displays the capacity to agree with this current plan. Patient also agrees that, currently, the benefits outweigh the risks and would like to pursue treatment at this time, and had no other questions.     Instructions:  Take all medications as prescribed.    2. Abstain from recreational drugs and alcohol.  3. Present to ED or call 911 for SI/HI plan or intent, psychosis, or medical emergency.    Follow-up  Follow up in about 3 months (around 9/20/2025) for medication management, Virtual Visit.    Initial Interview  03/19/2025  HPI: Vanna Martinez is a 24 y.o. female here today for a psychiatric evaluation referred by PCP to the [facility] for evaluation and medication management of anxiety.    Chief Complaint: "Bad anxiety, negative thoughts, physical symptoms like blushing/rushing and crying spells"  Onset: chronic  Precipitating factors: difficulty at work  Persistent symptoms: depression, irritability, worry/anxiety, disorganization      Patient states that she has generalized anxiety; that there is not one specific thing that is causing her anxiety.   States that her anxiety started when she was in HS 2017. She got professional help (a therapist and a psychiatrist for meds) when she was in college (2020) and it was helpful. She was started on Gabapentin and Wellbutrin and she stayed on the meds for a few years. She got off of the meds about 6 months ago because she felt that the meds were not working. She was beginning to have physical sympomts of anxiety.   She does not feel that she is having panic attacks but she continues to report symptoms that verge on the edge of panic. She states that she has a lot of anticipatory anxiety. She states that, for example, while traveling to a bird event, she will start to cry out of fear or anxiety that she will not do well. "   Her anxiety is worsening.   Currently, she is prescribed Vistaril but she does not take it because it makes her drowsy.   She has never had Xanax or Ativan.   Will start Prozac 20mg a day.   Will also start Xanax 0.25mg PRN severe anxiety/panic.  Also strongly encouraged 1:1 therapy.      Past Medical History:     -------------------------------------    Depression        Medications:   Current Outpatient Medications   Medication Instructions    ALPRAZolam (XANAX) 0.25 mg, Oral, 2 times daily PRN    FLUoxetine 20 mg, Oral, Daily    hydrOXYzine pamoate (VISTARIL) 25 MG Cap     mupirocin (BACTROBAN) 2 % ointment Topical (Top), 2 times daily    propranoloL (INDERAL) 10 MG tablet         Psychiatric History:   Reports a prior psychiatric history: anxiety  History of mental health out-patient treatment: therapist and psychiatrist  Has been to 1:1 therapy in the past and is willing to go back to 1:1 therapy but not to group therapy  History of in-patient psychiatric hospitalization: denies  History of suicidal ideations: denies  History of suicidal threats: denies  History of suicide attempts: denies  History of self mutilation: denies    History of psychotropic medications:   Wellbutrin  Neurontin  Vistaril  Propranolol    Family Psychiatric History:  Mental Illness: mother with anxiety  Alcohol abuse/addiction:   Drug addiction:     Substance Use History:  Denies any history of drug or alcohol abuse or addiction  Alcohol: denies  Marijuana: smokes about once a week  Benzodiazepines: denies  Opiates: denies  Stimulants: denies  Cocaine: denies  Methamphetamine: denies  Nicotine:  Caffeine:    Social History:   [place]rew up in: [place]  Raised by: parents  Number of siblings: one  Education: college Environmental Science   Employment: employed - she will start [employer] on March 31  Marital Status: single  Children: none  Living situation: in a house with boyfriend  [employer] affiliation: none    Trauma History:  History of  Emotional/Mental abuse: denies  History of Physical abuse: denies  History of Sexual abuse: denies  History of other trauma: denies    Legal History:  Legal history: denies  Denies being on probation or parole  Denies any upcoming court dates  Denies any pending charges.    PHQ Score:   03/19/2025: 6 mild    GREER-7 Score:   03/19/2025: 14 moderate    Mental Status Evaluation:  Appearance:  unremarkable, age appropriate   Behavior:  normal, cooperative   Speech:  no latency; no press   Mood:  euthymic   Affect:  congruent and appropriate   Thought Process:  normal and logical   Thought Content:  normal, no suicidality, no homicidality, delusions, or paranoia   Sensorium:  grossly intact   Cognition:  grossly intact   Insight:  intact   Judgment:  behavior is adequate to circumstances     Impression:      ICD-10-CM ICD-9-CM   1. GREER (generalized anxiety disorder)  F41.1 300.02   2. Recurrent major depressive disorder, in full remission  F33.42 296.36      Plan:  1. Start Prozac 20mg a day  2. Start Xanax 0.25mg BID as needed for panic  3. FU in 3 months virtual